# Patient Record
Sex: MALE | Race: WHITE | URBAN - NONMETROPOLITAN AREA
[De-identification: names, ages, dates, MRNs, and addresses within clinical notes are randomized per-mention and may not be internally consistent; named-entity substitution may affect disease eponyms.]

---

## 2020-07-28 ENCOUNTER — VIRTUAL VISIT (OUTPATIENT)
Dept: FAMILY MEDICINE CLINIC | Age: 70
End: 2020-07-28
Payer: MEDICARE

## 2020-07-28 DIAGNOSIS — I10 ESSENTIAL HYPERTENSION: ICD-10-CM

## 2020-07-28 DIAGNOSIS — I25.10 CORONARY ARTERY DISEASE INVOLVING NATIVE CORONARY ARTERY OF NATIVE HEART WITHOUT ANGINA PECTORIS: ICD-10-CM

## 2020-07-28 DIAGNOSIS — E78.2 MIXED HYPERLIPIDEMIA: Primary | ICD-10-CM

## 2020-07-28 DIAGNOSIS — D69.6 THROMBOCYTOPENIA, UNSPECIFIED (HCC): ICD-10-CM

## 2020-07-28 PROBLEM — I25.2 HISTORY OF MYOCARDIAL INFARCTION: Status: ACTIVE | Noted: 2020-07-28

## 2020-07-28 PROBLEM — M19.90 ARTHRITIS: Status: ACTIVE | Noted: 2020-07-28

## 2020-07-28 PROCEDURE — 99442 PR PHYS/QHP TELEPHONE EVALUATION 11-20 MIN: CPT | Performed by: NURSE PRACTITIONER

## 2020-07-28 RX ORDER — ATORVASTATIN CALCIUM 20 MG/1
TABLET, FILM COATED ORAL
COMMUNITY
Start: 2020-05-26 | End: 2020-07-28 | Stop reason: SDUPTHER

## 2020-07-28 RX ORDER — ASPIRIN 81 MG/1
81 TABLET ORAL DAILY
COMMUNITY

## 2020-07-28 RX ORDER — LISINOPRIL AND HYDROCHLOROTHIAZIDE 20; 25 MG/1; MG/1
TABLET ORAL
COMMUNITY
Start: 2020-05-24 | End: 2020-07-28 | Stop reason: SDUPTHER

## 2020-07-28 RX ORDER — ATORVASTATIN CALCIUM 20 MG/1
20 TABLET, FILM COATED ORAL DAILY
Qty: 90 TAB | Refills: 1 | Status: SHIPPED | OUTPATIENT
Start: 2020-07-28 | End: 2021-02-01

## 2020-07-28 RX ORDER — LISINOPRIL AND HYDROCHLOROTHIAZIDE 20; 25 MG/1; MG/1
1 TABLET ORAL DAILY
Qty: 90 TAB | Refills: 1 | Status: SHIPPED | OUTPATIENT
Start: 2020-07-28 | End: 2021-02-01

## 2020-07-28 NOTE — PROGRESS NOTES
I am seeing this patient today virtually using HIPAA-compliant video-conferencing technology. The patient has previously provided full consent to use this technology and understands the risks and benefits of proceeding. I am seeing the patient today from my office in [place], [place]. The patient is in his/her home located within [place]. Patient already made aware that this is a virtual visit with provider and that for the purposes of billing, we will submit a claim for reimbursement with your insurance company. He/She was informed that he/she will be responsible for any copays, coinsurance amounts or other amounts not covered by his/her insurance company. Patient consented and accepted terms of virtual visit. RAINA Alejandra is a 79 y.o. male and presents today for Follow Up Chronic Condition and Hypertension  He also has a history of a arthritis, CAD, hyperlipidemia, MI and thrombocytopenia. Historically, he has had low platelet count on several previous CBCs. He has seen hematology in the past and does not want to go back as he is not concerned. He does not see cardiology on a regular basis. He does have 1 stent. Takes daily aspirin and is on a statin. Historically, he is refused to go back to cardiology and says he will go back when there is a problem. Allergies    No Known Allergies     Medications    Current Outpatient Medications   Medication Sig Dispense    atorvastatin (LIPITOR) 20 mg tablet      lisinopril-hydroCHLOROthiazide (PRINZIDE, ZESTORETIC) 20-25 mg per tablet      aspirin delayed-release 81 mg tablet Take 81 mg by mouth daily. No current facility-administered medications for this visit.         Health Maintenance    Health Maintenance Due   Topic Date Due    Hepatitis C Screening  1950    Lipid Screen  03/25/1960    DTaP/Tdap/Td series (1 - Tdap) 03/25/1971    Shingrix Vaccine Age 50> (1 of 2) 03/25/2000    FOBT Q1Y Age 50-75  03/25/2000    GLAUCOMA SCREENING Q2Y  2015    Pneumococcal 65+ years (1 of 1 - PPSV23) 2015    Medicare Yearly Exam  2020      Declines all at this time      Problem List    Patient Active Problem List    Diagnosis Date Noted    Arthritis 2020    Coronary artery disease involving native coronary artery of native heart without angina pectoris 2020    Essential hypertension 2020    Mixed hyperlipidemia 2020    History of myocardial infarction 2020    Thrombocytopenia, unspecified ([address] 2020        Family Hx    Family History   Problem Relation Age of Onset    Dementia Mother         Social Hx    Social History     Socioeconomic History    Marital status:      Spouse name: Not on file    Number of children: Not on file    Years of education: Not on file    Highest education level: Not on file   Tobacco Use    Smoking status: Former Smoker     Years: 50.00     Last attempt to quit: 2015     Years since quittin.5    Smokeless tobacco: Former User     Quit date: 2015   Substance and Sexual Activity    Alcohol use: Not Currently    Drug use: Never    Sexual activity: Yes        Surgical Hx    Past Surgical History:   Procedure Laterality Date    CARDIAC SURG PROCEDURE UNLIST          Vitals    There were no vitals taken for this visit. Patient-Reported Vitals 2020   Patient-Reported Systolic  356   Patient-Reported Diastolic 73        ROS    Review of Systems   Constitutional: Negative for malaise/fatigue. HENT: Negative for sinus pain and sore throat. Respiratory: Negative for cough and shortness of breath. Cardiovascular: Negative for chest pain and palpitations. Gastrointestinal: Negative for constipation, diarrhea, heartburn, nausea and vomiting. Musculoskeletal: Negative for falls, joint pain and myalgias. Neurological: Negative for dizziness, weakness and headaches.    Psychiatric/Behavioral: Negative for depression and suicidal ideas. The patient does not have insomnia. Physical Exam    Patient is a 79y.o. year old male     Physical exam was deferred due to [place]- 19 precautions as visit was completed via telemedicine. Assessment/Plan      ICD-10-CM ICD-9-CM    1. Mixed hyperlipidemia  E78.2 272.2    2. Essential hypertension  I10 401.9    3. Coronary artery disease involving native coronary artery of native heart without angina pectoris  I25.10 414.01    4. Thrombocytopenia, unspecified (HCC)  D69.6 287.5      Cont to monitor bp and report consistent readings >130/80 or <100/60. Cont current medication regimen  Patient declines blood work today and states that he will do with next follow up. Patient declines any quality measure screenings at this time. Health Maintenance Items reviewed with patient as noted. 15 minutes spent with patient during virtual appt discussing diagnoses, treatment options, and answering any patient questions.     Ya Hernandez NP

## 2021-02-01 DIAGNOSIS — E78.2 MIXED HYPERLIPIDEMIA: ICD-10-CM

## 2021-02-01 DIAGNOSIS — I10 ESSENTIAL HYPERTENSION: ICD-10-CM

## 2021-02-01 RX ORDER — LISINOPRIL AND HYDROCHLOROTHIAZIDE 20; 25 MG/1; MG/1
TABLET ORAL
Qty: 90 TAB | Refills: 1 | Status: SHIPPED | OUTPATIENT
Start: 2021-02-01 | End: 2021-06-08 | Stop reason: ALTCHOICE

## 2021-02-01 RX ORDER — ATORVASTATIN CALCIUM 20 MG/1
TABLET, FILM COATED ORAL
Qty: 90 TAB | Refills: 1 | Status: SHIPPED | OUTPATIENT
Start: 2021-02-01 | End: 2021-06-08 | Stop reason: SDUPTHER

## 2021-05-03 ENCOUNTER — VIRTUAL VISIT (OUTPATIENT)
Dept: FAMILY MEDICINE CLINIC | Age: 71
End: 2021-05-03
Payer: MEDICARE

## 2021-05-03 DIAGNOSIS — Z13.31 SCREENING FOR DEPRESSION: ICD-10-CM

## 2021-05-03 DIAGNOSIS — Z13.39 SCREENING FOR ALCOHOLISM: ICD-10-CM

## 2021-05-03 DIAGNOSIS — M10.9 ACUTE GOUT INVOLVING TOE OF LEFT FOOT, UNSPECIFIED CAUSE: Primary | ICD-10-CM

## 2021-05-03 DIAGNOSIS — I10 ESSENTIAL HYPERTENSION: ICD-10-CM

## 2021-05-03 DIAGNOSIS — Z00.00 MEDICARE ANNUAL WELLNESS VISIT, SUBSEQUENT: ICD-10-CM

## 2021-05-03 PROCEDURE — G8536 NO DOC ELDER MAL SCRN: HCPCS | Performed by: NURSE PRACTITIONER

## 2021-05-03 PROCEDURE — G0439 PPPS, SUBSEQ VISIT: HCPCS | Performed by: NURSE PRACTITIONER

## 2021-05-03 PROCEDURE — 1101F PT FALLS ASSESS-DOCD LE1/YR: CPT | Performed by: NURSE PRACTITIONER

## 2021-05-03 PROCEDURE — G8756 NO BP MEASURE DOC: HCPCS | Performed by: NURSE PRACTITIONER

## 2021-05-03 PROCEDURE — 99442 PR PHYS/QHP TELEPHONE EVALUATION 11-20 MIN: CPT | Performed by: NURSE PRACTITIONER

## 2021-05-03 PROCEDURE — G8432 DEP SCR NOT DOC, RNG: HCPCS | Performed by: NURSE PRACTITIONER

## 2021-05-03 PROCEDURE — G8427 DOCREV CUR MEDS BY ELIG CLIN: HCPCS | Performed by: NURSE PRACTITIONER

## 2021-05-03 PROCEDURE — 3017F COLORECTAL CA SCREEN DOC REV: CPT | Performed by: NURSE PRACTITIONER

## 2021-05-03 PROCEDURE — G8421 BMI NOT CALCULATED: HCPCS | Performed by: NURSE PRACTITIONER

## 2021-05-03 RX ORDER — METHYLPREDNISOLONE 4 MG/1
TABLET ORAL
Qty: 1 DOSE PACK | Refills: 0 | Status: SHIPPED | OUTPATIENT
Start: 2021-05-03 | End: 2021-06-08

## 2021-05-03 RX ORDER — COLCHICINE 0.6 MG/1
TABLET ORAL
Qty: 30 TAB | Refills: 0 | Status: SHIPPED | OUTPATIENT
Start: 2021-05-03 | End: 2021-06-11

## 2021-05-03 NOTE — PATIENT INSTRUCTIONS
Medicare Wellness Visit, Male    The best way to live healthy is to have a lifestyle where you eat a well-balanced diet, exercise regularly, limit alcohol use, and quit all forms of tobacco/nicotine, if applicable. Regular preventive services are another way to keep healthy. Preventive services (vaccines, screening tests, monitoring & exams) can help personalize your care plan, which helps you manage your own care. Screening tests can find health problems at the earliest stages, when they are easiest to treat. [facility] follows the current, evidence-based guidelines published by the [facility] ([facility]) when recommending preventive services for our patients. Because we follow these guidelines, sometimes recommendations change over time as research supports it. (For example, a prostate screening blood test is no longer routinely recommended for men with no symptoms). Of course, you and your doctor may decide to screen more often for some diseases, based on your risk and co-morbidities (chronic disease you are already diagnosed with). Preventive services for you include:  - Medicare offers their members a free annual wellness visit, which is time for you and your primary care provider to discuss and plan for your preventive service needs. Take advantage of this benefit every year!  -All adults over age 72 should receive the recommended pneumonia vaccines. Current USPSTF guidelines recommend a series of two vaccines for the best pneumonia protection.   -All adults should have a flu vaccine yearly and tetanus vaccine every 10 years.  -All adults age 48 and older should receive the shingles vaccines (series of two vaccines).        -All adults age 38-68 who are overweight should have a diabetes screening test once every three years.   -Other screening tests & preventive services for persons with diabetes include: an eye exam to screen for diabetic retinopathy, a kidney function test, a foot exam, and stricter control over your cholesterol.   -Cardiovascular screening for adults with routine risk involves an electrocardiogram (ECG) at intervals determined by the provider.   -Colorectal cancer screening should be done for adults age 54-65 with no increased risk factors for colorectal cancer. There are a number of acceptable methods of screening for this type of cancer. Each test has its own benefits and drawbacks. Discuss with your provider what is most appropriate for you during your annual wellness visit. The different tests include: colonoscopy (considered the best screening method), a fecal occult blood test, a fecal DNA test, and sigmoidoscopy.  -All adults born between [facility] should be screened once for Hepatitis C.  -An Abdominal Aortic Aneurysm (AAA) Screening is recommended for men age 73-68 who has ever smoked in their lifetime.      Here is a list of your current Health Maintenance items (your personalized list of preventive services) with a due date:  Health Maintenance Due   Topic Date Due    Hepatitis C Test  Never done    Cholesterol Test   Never done    COVID-19 Vaccine (1) Never done    DTaP/Tdap/Td  (1 - Tdap) Never done    Shingles Vaccine (1 of 2) Never done    Colorectal Screening  Never done    Pneumococcal Vaccine (1 of 1 - PPSV23) Never done

## 2021-05-03 NOTE — PROGRESS NOTES
Ofelia Shafer presents today for   Chief Complaint   Patient presents with    Gout     Since Fri    Annual Wellness Visit     medicare       Depression Screening:  3 most recent PHQ Screens 5/3/2021   PHQ Not Done -   Little interest or pleasure in doing things Not at all   Feeling down, depressed, irritable, or hopeless Not at all   Total Score PHQ 2 0       Learning Assessment:  No flowsheet data found. Fall Risk  Fall Risk Assessment, last 12 mths 5/3/2021   Able to walk? Yes   Fall in past 12 months? 0       ADL  ADL Assessment 5/3/2021   Feeding yourself No Help Needed   Getting from bed to chair No Help Needed   Getting dressed No Help Needed   Bathing or showering No Help Needed   Walk across the room (includes cane/walker) No Help Needed   Using the telphone No Help Needed   Taking your medications No Help Needed   Preparing meals No Help Needed   Managing money (expenses/bills) No Help Needed   Moderately strenuous housework (laundry) No Help Needed   Shopping for personal items (toiletries/medicines) No Help Needed   Shopping for groceries No Help Needed   Driving No Help Needed   Climbing a flight of stairs No Help Needed   Getting to places beyond walking distances No Help Needed       Health Maintenance reviewed and discussed and ordered per Provider. Health Maintenance Due   Topic Date Due    Hepatitis C Screening  Never done    Lipid Screen  Never done    COVID-19 Vaccine (1) Never done    DTaP/Tdap/Td series (1 - Tdap) Never done    Shingrix Vaccine Age 50> (1 of 2) Never done    Colorectal Cancer Screening Combo  Never done    Pneumococcal 65+ years (1 of 1 - PPSV23) Never done    Medicare Yearly Exam  Never done   . Coordination of Care:  1. Have you been to the ER, urgent care clinic since your last visit? Hospitalized since your last visit? no    2. Have you seen or consulted any other health care providers outside of the [address] [place] [address] [place] since your last visit? Include any pap smears or colon screening.  No    Height-5'11    BP taken yesterday    Patient has been eating Butterfinger a lot    Foot, ankle and toes on left side of body

## 2021-05-03 NOTE — PROGRESS NOTES
I am seeing this patient today virtually using HIPAA-compliant video-conferencing technology due to the COVID-19 Pandemic. The patient has previously provided full consent to use this technology and understands the risks and benefits of proceeding. I am seeing the patient today from my office in [place], [place]. The patient is in his/her home located within [place]. Patient already made aware that this is a virtual visit/telehealth with provider and that for the purposes of billing, we will submit a claim for reimbursement with your insurance company. He/She was informed that he/she will be responsible for any copays, coinsurance amounts or other amounts not covered by his/her insurance company. Patient consented and accepted terms of virtual visit. THIS VISIT WAS CONDUCTED OVER THE TELEPHONE    HPI  Cindy Davis is a 70 y.o. male and presents today for Gout (Since Fri) and Annual Wellness Visit (medicare)  In addition to updating his Medicare wellness he has complaints of gout flareup in his left great toe. He wonders if it has something to do with his blood pressure medication. He also admits to eating a lot of chocolate lately. He is overdue for his chronic follow-up with me and will get that scheduled to come into the office for. He is also overdue for blood work which we will update at that time.     Allergies  No Known Allergies     Medications  Current Outpatient Medications   Medication Sig Dispense    methylPREDNISolone (MEDROL DOSEPACK) 4 mg tablet Take as directed on packaging 1 Dose Pack    colchicine 0.6 mg tablet Take 1 tab by mouth three times daily on first day of gout attack, then day 2 and beyond, 1 tab by mouth daily until attack resolves 30 Tab    atorvastatin (LIPITOR) 20 mg tablet TAKE 1 TABLET BY MOUTH DAILY 90 Tab    lisinopril-hydroCHLOROthiazide (PRINZIDE, ZESTORETIC) 20-25 mg per tablet TAKE 1 TABLET BY MOUTH DAILY 90 Tab    aspirin delayed-release 81 mg tablet Take 81 mg by mouth daily. No current facility-administered medications for this visit. Screening  On the basis of positive PHQ-9 screening ( ), C-SSRS completed. Patient will follow-up as needed/as directed with PCP. Health Maintenance  Health Maintenance Due   Topic Date Due    Lipid Screen  Never done    Colorectal Cancer Screening Combo  Never done        Problem List  Patient Active Problem List    Diagnosis Date Noted    Arthritis 2020    Coronary artery disease involving native coronary artery of native heart without angina pectoris 2020    Essential hypertension 2020    Mixed hyperlipidemia 2020    History of myocardial infarction 2020    Thrombocytopenia, unspecified ([facility] [address] 2020        Family Hx  Family History   Problem Relation Age of Onset    Dementia Mother         Social Hx  Social History     Socioeconomic History    Marital status:      Spouse name: Not on file    Number of children: Not on file    Years of education: Not on file    Highest education level: Not on file   Tobacco Use    Smoking status: Former Smoker     Years: 50.00     Quit date: 2015     Years since quittin.3    Smokeless tobacco: Former User     Quit date: 2015   Substance and Sexual Activity    Alcohol use: Not Currently    Drug use: Never    Sexual activity: Yes        Surgical Hx  Past Surgical History:   Procedure Laterality Date    [place] CARDIAC SURG PROCEDURE UNLIST          Vitals  There were no vitals taken for this visit. Patient-Reported Vitals 5/3/2021   Patient-Reported Weight 210   Patient-Reported Pulse 72   Patient-Reported Systolic  508   Patient-Reported Diastolic 80        ROS  Review of Systems   Constitutional: Negative for chills, fever and malaise/fatigue. HENT: Negative for congestion, ear pain, sinus pain and sore throat. Eyes: Negative for blurred vision and redness.    Respiratory: Negative for cough, sputum production, shortness of breath and wheezing. Cardiovascular: Negative for chest pain, palpitations and leg swelling. Gastrointestinal: Negative for abdominal pain, constipation, diarrhea, heartburn, nausea and vomiting. Genitourinary: Negative for dysuria and hematuria. Musculoskeletal: Negative for falls, joint pain and myalgias. Skin: Negative for rash. Neurological: Negative for dizziness, seizures, weakness and headaches. Endo/Heme/Allergies: Does not bruise/bleed easily. Psychiatric/Behavioral: Negative for depression and suicidal ideas. The patient does not have insomnia. Physical Exam  Patient is a 70y.o. year old male     Physical exam was deferred due to [place]- 19 precautions as visit was completed via telemedicine. Assessment/Plan  1. Medicare annual wellness visit, subsequent  See Medicare wellness note  - [place] ANNU[place] ALCOH[place] SCREEN 15 MIN    2. Screening for alcoholism  See Medicare wellness note  - [place] ANNUAL ALCOH[place] SCREEN 15 MIN    3. Screening for depression  See Medicare wellness note  - Mckayla Griffin    4. Acute gout involving toe of left foot, unspecified cause  Left great toe; will start steroids and NSAIDs per Rx; medication side effects discussed  - methylPREDNISolone (MEDROL DOSEPACK) 4 mg tablet; Take as directed on packaging  Dispense: 1 Dose Pack; Refill: 0  - colchicine 0.6 mg tablet; Take 1 tab by mouth three times daily on first day of gout attack, then day 2 and beyond, 1 tab by mouth daily until attack resolves  Dispense: 30 Tab; Refill: 0    5. Essential hypertension  Home BP that was reported is okay at this time; we discussed because of his recent gout flareups that we may need to discontinue HCTZ from his blood pressure medication. We would then do only lisinopril. Patient will consider this in the future with his next refill. Health Maintenance Items reviewed with patient; updates completed if applicable.     >21 minutes spent with patient/reviewing records during virtual appt discussing diagnoses, treatment options, and answering any patient questions. Disclaimer:  I have discussed the diagnosis with the patient and the intended plan as seen above. The patient understands our medical plan. The risks, benefits and significant side effects of all medications have been reviewed. Anticipated time course and progression of condition reviewed. All questions have been addressed. He received an after visit summary, with information reviewed, and questions answered. Where appropriate, he/she is instructed to call the clinic if he/she has not been notified either by phone or through [id number] with the results of his/her tests or with an appointment plan for any referrals within 1 week(s). The patient  is to call if his condition worsens or fails to improve or if significant side effects are experienced.      JS Cabrera-BC

## 2021-06-08 ENCOUNTER — OFFICE VISIT (OUTPATIENT)
Dept: FAMILY MEDICINE CLINIC | Age: 71
End: 2021-06-08
Payer: MEDICARE

## 2021-06-08 VITALS
HEART RATE: 66 BPM | BODY MASS INDEX: 30.24 KG/M2 | OXYGEN SATURATION: 96 % | SYSTOLIC BLOOD PRESSURE: 118 MMHG | DIASTOLIC BLOOD PRESSURE: 60 MMHG | HEIGHT: 71 IN | WEIGHT: 216 LBS | TEMPERATURE: 98 F

## 2021-06-08 DIAGNOSIS — E55.9 VITAMIN D DEFICIENCY: ICD-10-CM

## 2021-06-08 DIAGNOSIS — E53.8 VITAMIN B12 DEFICIENCY: ICD-10-CM

## 2021-06-08 DIAGNOSIS — R25.2 LEG CRAMP: ICD-10-CM

## 2021-06-08 DIAGNOSIS — I25.10 CORONARY ARTERY DISEASE INVOLVING NATIVE CORONARY ARTERY OF NATIVE HEART WITHOUT ANGINA PECTORIS: ICD-10-CM

## 2021-06-08 DIAGNOSIS — M10.9 GOUT, UNSPECIFIED CAUSE, UNSPECIFIED CHRONICITY, UNSPECIFIED SITE: ICD-10-CM

## 2021-06-08 DIAGNOSIS — D69.6 THROMBOCYTOPENIA, UNSPECIFIED (HCC): ICD-10-CM

## 2021-06-08 DIAGNOSIS — E78.2 MIXED HYPERLIPIDEMIA: ICD-10-CM

## 2021-06-08 DIAGNOSIS — I10 ESSENTIAL HYPERTENSION: Primary | ICD-10-CM

## 2021-06-08 DIAGNOSIS — R73.01 IMPAIRED FASTING GLUCOSE: ICD-10-CM

## 2021-06-08 DIAGNOSIS — I10 ESSENTIAL HYPERTENSION: ICD-10-CM

## 2021-06-08 DIAGNOSIS — Z12.5 SCREENING FOR MALIGNANT NEOPLASM OF PROSTATE: ICD-10-CM

## 2021-06-08 DIAGNOSIS — Z12.11 SCREENING FOR MALIGNANT NEOPLASM OF COLON: ICD-10-CM

## 2021-06-08 PROCEDURE — G8417 CALC BMI ABV UP PARAM F/U: HCPCS | Performed by: NURSE PRACTITIONER

## 2021-06-08 PROCEDURE — G8427 DOCREV CUR MEDS BY ELIG CLIN: HCPCS | Performed by: NURSE PRACTITIONER

## 2021-06-08 PROCEDURE — G8754 DIAS BP LESS 90: HCPCS | Performed by: NURSE PRACTITIONER

## 2021-06-08 PROCEDURE — G8752 SYS BP LESS 140: HCPCS | Performed by: NURSE PRACTITIONER

## 2021-06-08 PROCEDURE — G8536 NO DOC ELDER MAL SCRN: HCPCS | Performed by: NURSE PRACTITIONER

## 2021-06-08 PROCEDURE — 3017F COLORECTAL CA SCREEN DOC REV: CPT | Performed by: NURSE PRACTITIONER

## 2021-06-08 PROCEDURE — 99214 OFFICE O/P EST MOD 30 MIN: CPT | Performed by: NURSE PRACTITIONER

## 2021-06-08 PROCEDURE — 1101F PT FALLS ASSESS-DOCD LE1/YR: CPT | Performed by: NURSE PRACTITIONER

## 2021-06-08 PROCEDURE — G8432 DEP SCR NOT DOC, RNG: HCPCS | Performed by: NURSE PRACTITIONER

## 2021-06-08 RX ORDER — ATORVASTATIN CALCIUM 20 MG/1
TABLET, FILM COATED ORAL
Qty: 90 TABLET | Refills: 1 | Status: SHIPPED | OUTPATIENT
Start: 2021-06-08 | End: 2021-07-23 | Stop reason: SDUPTHER

## 2021-06-08 RX ORDER — LISINOPRIL 20 MG/1
20 TABLET ORAL DAILY
Qty: 90 TABLET | Refills: 1 | Status: SHIPPED | OUTPATIENT
Start: 2021-06-08 | End: 2021-07-23 | Stop reason: SDUPTHER

## 2021-06-08 NOTE — PROGRESS NOTES
Gonzalez Oneal presents today for   Chief Complaint   Patient presents with    Hypertension     1 month f/u       Is someone accompanying this pt? No       Is the patient using any DME equipment during OV? no    Depression Screening:  3 most recent PHQ Screens 6/8/2021   PHQ Not Done -   Little interest or pleasure in doing things Not at all   Feeling down, depressed, irritable, or hopeless Not at all   Total Score PHQ 2 0       Learning Assessment:  Learning Assessment 5/3/2021   PRIMARY LEARNER Patient   HIGHEST LEVEL OF EDUCATION - PRIMARY LEARNER  GRADUATED HIGH SCHOOL OR GED   BARRIERS PRIMARY LEARNER NONE   CO-LEARNER CAREGIVER No   PRIMARY LANGUAGE ENGLISH   LEARNER PREFERENCE PRIMARY READING   ANSWERED BY patient   RELATIONSHIP SELF       Fall Risk  Fall Risk Assessment, last 12 mths 6/8/2021   Able to walk? Yes   Fall in past 12 months? 0       ADL  ADL Assessment 6/8/2021   Feeding yourself No Help Needed   Getting from bed to chair No Help Needed   Getting dressed No Help Needed   Bathing or showering No Help Needed   Walk across the room (includes cane/walker) No Help Needed   Using the telphone No Help Needed   Taking your medications No Help Needed   Preparing meals No Help Needed   Managing money (expenses/bills) No Help Needed   Moderately strenuous housework (laundry) No Help Needed   Shopping for personal items (toiletries/medicines) No Help Needed   Shopping for groceries No Help Needed   Driving No Help Needed   Climbing a flight of stairs No Help Needed   Getting to places beyond walking distances No Help Needed       Health Maintenance reviewed and discussed and ordered per Provider. Health Maintenance Due   Topic Date Due    Lipid Screen  Never done    Colorectal Cancer Screening Combo  Never done   . Coordination of Care:  1. Have you been to the ER, urgent care clinic since your last visit? Hospitalized since your last visit? no    2.  Have you seen or consulted any other health care providers outside of the [address] [place] [address] since your last visit? Include any pap smears or colon screening. no    BP medication? Gout medication?

## 2021-06-08 NOTE — PROGRESS NOTES
HPI    Harriet Bergman is a 70 y.o. male and presents today for Hypertension (1 month f/u)  He has a history of hypertension, hyperlipidemia and gout. He also has a history of CAD. He does not see cardiology. Previously when we have discussed this, he says that he will follow up with cardiology when he has problems. He denies chest pain, shortness of breath, dizziness, weakness, abdominal pain and denies swelling in his lower legs. He has had several gout flareups and so we are following up today on his blood pressure. Previously we had discussed converting his lisinopril/HCTZ to just lisinopril. Is also due for blood work. Never had a colonoscopy but would like to do the Cologuard. He also complains of occasional leg cramps. Allergies    No Known Allergies     Medications    Current Outpatient Medications   Medication Sig Dispense    atorvastatin (LIPITOR) 20 mg tablet TAKE 1 TABLET BY MOUTH DAILY 90 Tablet    lisinopriL (PRINIVIL, ZESTRIL) 20 mg tablet Take 1 Tablet by mouth daily. 90 Tablet    colchicine 0.6 mg tablet Take 1 tab by mouth three times daily on first day of gout attack, then day 2 and beyond, 1 tab by mouth daily until attack resolves 30 Tab    aspirin delayed-release 81 mg tablet Take 81 mg by mouth daily. No current facility-administered medications for this visit. Screening  On the basis of positive PHQ-9 screening ( ), C-SSRS completed. Patient will follow-up as needed/as directed with PCP.     Health Maintenance    Health Maintenance Due   Topic Date Due    Lipid Screen  Never done    Colorectal Cancer Screening Combo  Never done        Problem List    Patient Active Problem List    Diagnosis Date Noted    Gout 06/08/2021    Leg cramp 06/08/2021    Arthritis 07/28/2020    Coronary artery disease involving native coronary artery of native heart without angina pectoris 07/28/2020    Essential hypertension 07/28/2020    Mixed hyperlipidemia 07/28/2020    History of myocardial infarction 2020    Thrombocytopenia, unspecified ([facility] [address] 2020        Family Hx    Family History   Problem Relation Age of Onset    Dementia Mother         Social Hx    Social History     Socioeconomic History    Marital status:      Spouse name: Not on file    Number of children: Not on file    Years of education: Not on file    Highest education level: Not on file   Tobacco Use    Smoking status: Former Smoker     Years: 50.00     Quit date: 2015     Years since quittin.4    Smokeless tobacco: Former User     Quit date: 2015   Vaping Use    Vaping Use: Never used   Substance and Sexual Activity    Alcohol use: Not Currently    Drug use: Never    Sexual activity: Yes     Social Determinants of Health     Financial Resource Strain:     Difficulty of Paying Living Expenses:    Food Insecurity:     Worried About Running Out of Food in the Last Year:     [address] in the Last Year:    Transportation Needs:     Lack of Transportation (Medical):  Lack of Transportation (Non-Medical):    Physical Activity:     Days of Exercise per Week:     Minutes of Exercise per Session:    Stress:     Feeling of Stress :    Social Connections:     Frequency of Communication with Friends and Family:     Frequency of Social Gatherings with Friends and Family:     Attends Yazidism Services:     Active Member of Clubs or Organizations:     Attends Club or Organization Meetings:     Marital Status:         Surgical Hx    Past Surgical History:   Procedure Laterality Date    [place] CARDIAC SURG PROCEDURE UNLIST            Vitals    Visit Vitals  /60   Pulse 66   Temp 98 °F (36.7 °C)   Ht 5' 11\" (1.803 m)   Wt 216 lb (98 kg)   SpO2 96%   BMI 30.13 kg/m²        ROS    Review of Systems   Constitutional: Negative for chills, fever and malaise/fatigue. HENT: Negative for congestion, ear pain, sinus pain and sore throat.     Eyes: Negative for blurred vision and redness. Respiratory: Negative for cough, sputum production, shortness of breath and wheezing. Cardiovascular: Negative for chest pain, palpitations and leg swelling. Gastrointestinal: Negative for abdominal pain, constipation, diarrhea, heartburn, nausea and vomiting. Genitourinary: Negative for dysuria and hematuria. Musculoskeletal: Positive for myalgias (leg cramps(bilateral)). Negative for falls and joint pain. Skin: Negative for rash. Neurological: Negative for dizziness, seizures, weakness and headaches. Endo/Heme/Allergies: Does not bruise/bleed easily. Psychiatric/Behavioral: Negative for depression and suicidal ideas. The patient does not have insomnia. Physical Exam    Physical Exam  Vitals and nursing note reviewed. Constitutional:       General: He is awake. He is not in acute distress. Appearance: Normal appearance. He is not ill-appearing or toxic-appearing. HENT:      Head: Normocephalic and atraumatic. Right Ear: External ear normal.      Left Ear: External ear normal.      Nose: Nose normal.      Right Sinus: No maxillary sinus tenderness or frontal sinus tenderness. Left Sinus: No maxillary sinus tenderness or frontal sinus tenderness. Mouth/Throat:      Mouth: Mucous membranes are moist.      Pharynx: Oropharynx is clear. No oropharyngeal exudate or posterior oropharyngeal erythema. Eyes:      General:         Right eye: No discharge. Left eye: No discharge. Extraocular Movements: Extraocular movements intact. Conjunctiva/sclera: Conjunctivae normal.      Pupils: Pupils are equal, round, and reactive to light. Cardiovascular:      Rate and Rhythm: Normal rate and regular rhythm. Pulses: Normal pulses. Heart sounds: Normal heart sounds. No murmur heard. Pulmonary:      Effort: Pulmonary effort is normal. No respiratory distress. Breath sounds: Normal breath sounds. No stridor.  No wheezing, rhonchi or rales. Chest:      Chest wall: No tenderness. Abdominal:      General: Abdomen is flat. Bowel sounds are normal. There is no distension. Palpations: Abdomen is soft. There is no mass. Tenderness: There is no abdominal tenderness. There is no right CVA tenderness, left CVA tenderness, guarding or rebound. Hernia: No hernia is present. Musculoskeletal:         General: No swelling. Normal range of motion. Cervical back: Normal range of motion and neck supple. Right lower leg: No edema. Left lower leg: No edema. Skin:     General: Skin is warm and dry. Capillary Refill: Capillary refill takes less than 2 seconds. Coloration: Skin is not jaundiced or pale. Findings: No bruising, erythema, lesion or rash. Neurological:      General: No focal deficit present. Mental Status: He is alert and oriented to person, place, and time. Cranial Nerves: No cranial nerve deficit. Motor: No weakness. Gait: Gait normal.      Deep Tendon Reflexes: Reflexes normal.   Psychiatric:         Mood and Affect: Mood normal.         Behavior: Behavior normal.         Thought Content: Thought content normal.         Judgment: Judgment normal.          Assessment/Plan    1. Essential hypertension  His BP is excellent today in office. I am refilling his medication and only giving him lisinopril. I am stopping the HCTZ due to his recentfrequent gout flareups. Cont to monitor bp and report consistent readings >130/80 or <100/60. Will update blood work; any changes to be made are contingent on the results. - LIPID PANEL; Future  - METABOLIC PANEL, COMPREHENSIVE; Future  - TSH 3RD GENERATION; Future  - lisinopriL (PRINIVIL, ZESTRIL) 20 mg tablet; Take 1 Tablet by mouth daily. Dispense: 90 Tablet; Refill: 1    2. Thrombocytopenia, unspecified ([facility] [address]  Chronic history of this; I believe he has seen hematology in the past with a negative work-up; we will continue to monitor. Will update blood work; any changes to be made are contingent on the results. - CBC W/O DIFF; Future    3. Mixed hyperlipidemia  He is currently on statin therapy with generic Lipitor 20 mg daily; will refill; Will update blood work; any changes to be made are contingent on the results. - LIPID PANEL; Future  - METABOLIC PANEL, COMPREHENSIVE; Future  - atorvastatin (LIPITOR) 20 mg tablet; TAKE 1 TABLET BY MOUTH DAILY  Dispense: 90 Tablet; Refill: 1    4. Coronary artery disease involving native coronary artery of native heart without angina pectoris  He has not seen cardiology in quite some time; he is not interested in doing so as he is not having any problems at this time    5. Impaired fasting glucose  - HEMOGLOBIN A1C WITH EAG; Future    6. Screening for malignant neoplasm of prostate  - PSA SCREENING (SCREENING)    7. Gout, unspecified cause, unspecified chronicity, unspecified site  He has had a few flare ups; we will check labs; I am stopping his hctz to see if this makes any difference  - URIC ACID; Future    8. Screening for malignant neoplasm of colon  Agrees to cologuard  - COLOGUARD TEST (FECAL DNA COLORECTAL CANCER SCREENING)    9. Vitamin B12 deficiency  - VITAMIN B12 & FOLATE    10. Vitamin D deficiency  - VITAMIN D, 25 HYDROXY; Future    11. Leg cramp  Will update blood work; any changes to be made are contingent on the results. - MAGNESIUM; Future     Health Maintenance Items reviewed with patient as noted.     Orders Placed This Encounter    HEMOGLOBIN A1C WITH EAG    LIPID PANEL    METABOLIC PANEL, COMPREHENSIVE    TSH 3RD GENERATION    PSA SCREENING (SCREENING)    COLOGUARD TEST (FECAL DNA COLORECTAL CANCER SCREENING)    URIC ACID    CBC W/O DIFF    VITAMIN B12 & FOLATE    VITAMIN D, 25 HYDROXY    MAGNESIUM    atorvastatin (LIPITOR) 20 mg tablet    lisinopriL (PRINIVIL, ZESTRIL) 20 mg tablet        Tyrell Ansari, MSN, FNP-BC

## 2021-06-11 DIAGNOSIS — M10.9 ACUTE GOUT INVOLVING TOE OF LEFT FOOT, UNSPECIFIED CAUSE: ICD-10-CM

## 2021-06-11 RX ORDER — LISINOPRIL AND HYDROCHLOROTHIAZIDE 20; 25 MG/1; MG/1
TABLET ORAL
Qty: 90 TABLET | Refills: 1 | Status: SHIPPED | OUTPATIENT
Start: 2021-06-11 | End: 2021-07-23

## 2021-06-11 RX ORDER — COLCHICINE 0.6 MG/1
TABLET ORAL
Qty: 30 TABLET | Refills: 0 | Status: SHIPPED | OUTPATIENT
Start: 2021-06-11

## 2021-06-23 LAB
CREATININE, EXTERNAL: 1
HBA1C MFR BLD HPLC: 5.5 %
LDL-C, EXTERNAL: 60
PSA, EXTERNAL: 0.39

## 2021-06-28 RX ORDER — ERGOCALCIFEROL 1.25 MG/1
50000 CAPSULE ORAL
Qty: 12 CAPSULE | Refills: 3 | Status: SHIPPED | OUTPATIENT
Start: 2021-06-28 | End: 2021-09-01 | Stop reason: SDUPTHER

## 2021-06-28 RX ORDER — FOLIC ACID 1 MG/1
1 TABLET ORAL DAILY
Qty: 90 TABLET | Refills: 1 | Status: SHIPPED | OUTPATIENT
Start: 2021-06-28

## 2021-06-28 RX ORDER — METHYLPREDNISOLONE 4 MG/1
TABLET ORAL
Qty: 1 DOSE PACK | Refills: 0 | Status: SHIPPED | OUTPATIENT
Start: 2021-06-28

## 2021-06-28 NOTE — PROGRESS NOTES
He continues to have low platelets; we have been following this for some time; I can send him to the hematologist for further eval of this but I think he is declined in the past.  Otherwise we will continue to watch. His vitamin D is a little low so I will send in prescription strength for him to take. His kidney and liver function look great. His magnesium level is normal.  His cholesterol levels, A1c, vitamin B12 are all normal.  His folic acid level is little bit low so I will send in a supplement for this as well. His thyroid is normal.  His uric acid level which is indicative of gout is actually elevated. He can use colchicine if he still has some; I can send in a prescription for steroids as well.

## 2021-07-23 ENCOUNTER — TELEPHONE (OUTPATIENT)
Dept: INTERNAL MEDICINE CLINIC | Age: 71
End: 2021-07-23

## 2021-07-23 DIAGNOSIS — D69.6 THROMBOCYTOPENIA (HCC): Primary | ICD-10-CM

## 2021-07-23 DIAGNOSIS — I10 ESSENTIAL HYPERTENSION: ICD-10-CM

## 2021-07-23 DIAGNOSIS — E78.2 MIXED HYPERLIPIDEMIA: ICD-10-CM

## 2021-07-23 RX ORDER — ATORVASTATIN CALCIUM 20 MG/1
TABLET, FILM COATED ORAL
Qty: 90 TABLET | Refills: 1 | Status: SHIPPED | OUTPATIENT
Start: 2021-07-23 | End: 2022-01-27 | Stop reason: SDUPTHER

## 2021-07-23 RX ORDER — LISINOPRIL 20 MG/1
20 TABLET ORAL DAILY
Qty: 90 TABLET | Refills: 1 | Status: SHIPPED | OUTPATIENT
Start: 2021-07-23 | End: 2022-01-27 | Stop reason: SDUPTHER

## 2021-07-23 NOTE — PROGRESS NOTES
Cologuard screening results are positive; the recommendations are that the patient be referred for eval and need for colonoscopy. We can put in a referral for GI if the patient agrees.

## 2021-07-23 NOTE — TELEPHONE ENCOUNTER
Patient says that he has low platelets and that he has found out that atorvastatin and aspirin can cause this. He wants to know if he should cut back on these two meds to help with the low platelet issue instead of having to take another pill for the platelets. He also says he needs his medications refilled and RX sent per MATTHEW Staton FNP order.   [id number]

## 2021-07-23 NOTE — PROGRESS NOTES
Patient says that he would like to take another test as he does not think this is right because of the rate of false positive results. He said he would like to repeat if this is an option and if they both say positive, then he would be more agreeable to have the colonoscopy. Please advise.

## 2021-08-04 ENCOUNTER — TELEPHONE (OUTPATIENT)
Dept: FAMILY MEDICINE CLINIC | Age: 71
End: 2021-08-04

## 2021-08-04 DIAGNOSIS — R19.5 POSITIVE COLORECTAL CANCER SCREENING USING COLOGUARD TEST: Primary | ICD-10-CM

## 2021-08-04 NOTE — TELEPHONE ENCOUNTER
Pt came by with request for a colonoscopy referral to [address] [place], [place] [address]ge  [phone]. He states he didn't pass the cologard test. He also said we did a referral to Hemolotogy and he doesn't know if he needs to go there before he does the colonoscopy or not so he wants to discuss.

## 2021-08-04 NOTE — TELEPHONE ENCOUNTER
Referral entered for GI, can go to hematology appointment before or after colonoscopy does not matter.

## 2021-09-01 ENCOUNTER — TELEPHONE (OUTPATIENT)
Dept: FAMILY MEDICINE CLINIC | Age: 71
End: 2021-09-01

## 2021-09-01 DIAGNOSIS — E55.9 VITAMIN D DEFICIENCY: Primary | ICD-10-CM

## 2021-09-01 RX ORDER — ERGOCALCIFEROL 1.25 MG/1
50000 CAPSULE ORAL
Qty: 12 CAPSULE | Refills: 1 | Status: SHIPPED | OUTPATIENT
Start: 2021-09-01 | End: 2022-01-27 | Stop reason: SDUPTHER

## 2022-01-27 ENCOUNTER — VIRTUAL VISIT (OUTPATIENT)
Dept: FAMILY MEDICINE CLINIC | Age: 72
End: 2022-01-27
Payer: MEDICARE

## 2022-01-27 DIAGNOSIS — E55.9 VITAMIN D DEFICIENCY: ICD-10-CM

## 2022-01-27 DIAGNOSIS — I10 ESSENTIAL HYPERTENSION: ICD-10-CM

## 2022-01-27 DIAGNOSIS — E78.2 MIXED HYPERLIPIDEMIA: ICD-10-CM

## 2022-01-27 DIAGNOSIS — I25.10 CORONARY ARTERY DISEASE INVOLVING NATIVE CORONARY ARTERY OF NATIVE HEART WITHOUT ANGINA PECTORIS: Primary | ICD-10-CM

## 2022-01-27 PROCEDURE — 99443 PR PHYS/QHP TELEPHONE EVALUATION 21-30 MIN: CPT | Performed by: NURSE PRACTITIONER

## 2022-01-27 RX ORDER — LISINOPRIL 20 MG/1
20 TABLET ORAL DAILY
Qty: 90 TABLET | Refills: 1 | Status: SHIPPED | OUTPATIENT
Start: 2022-01-27

## 2022-01-27 RX ORDER — ASCORBIC ACID 500 MG
1000 TABLET ORAL DAILY
COMMUNITY

## 2022-01-27 RX ORDER — ERGOCALCIFEROL 1.25 MG/1
50000 CAPSULE ORAL
Qty: 12 CAPSULE | Refills: 1 | Status: SHIPPED | OUTPATIENT
Start: 2022-01-27

## 2022-01-27 RX ORDER — ATORVASTATIN CALCIUM 20 MG/1
TABLET, FILM COATED ORAL
Qty: 90 TABLET | Refills: 1 | Status: SHIPPED | OUTPATIENT
Start: 2022-01-27

## 2022-01-27 NOTE — PROGRESS NOTES
Dmitry Meredith presents today for   Chief Complaint   Patient presents with   [facility].     transfer from [address] visit    Depression Screening:  3 most recent [address] 1/27/2022   [facility] Not Done -   Little interest or pleasure in doing things Not at all   Feeling down, depressed, irritable, or hopeless Not at all   Total Score PHQ 2 0       Learning Assessment:  Learning Assessment 5/3/2021   PRIMARY LEARNER Patient   HIGHEST LEVEL OF EDUCATION - PRIMARY LEARNER  GRADUATED HIGH SCHOOL OR GED   BARRIERS PRIMARY LEARNER NONE   CO-LEARNER CAREGIVER No   PRIMARY LANGUAGE ENGLISH   LEARNER PREFERENCE PRIMARY READING   ANSWERED BY patient   RELATIONSHIP SELF       Abuse Screening:  Abuse Screening Questionnaire 6/8/2021   Do you ever feel afraid of your partner? N   Are you in a relationship with someone who physically or mentally threatens you? N   Is it safe for you to go home? Y       Fall Risk  Fall Risk Assessment, last 12 mths 1/27/2022   Able to walk? Yes   Fall in past 12 months? 0   Do you feel unsteady? 0   Are you worried about falling 0       ADL  ADL Assessment 6/8/2021   Feeding yourself No Help Needed   Getting from bed to chair No Help Needed   Getting dressed No Help Needed   Bathing or showering No Help Needed   Walk across the room (includes cane/walker) No Help Needed   Using the telphone No Help Needed   Taking your medications No Help Needed   Preparing meals No Help Needed   Managing money (expenses/bills) No Help Needed   Moderately strenuous housework (laundry) No Help Needed   Shopping for personal items (toiletries/medicines) No Help Needed   Shopping for groceries No Help Needed   Driving No Help Needed   Climbing a flight of stairs No Help Needed   Getting to places beyond walking distances No Help Needed       Health Maintenance reviewed and discussed and ordered per Provider. There are no preventive care reminders to display for this patient. Maritza Finn Coordination of Care:  1. Have you been to the ER, urgent care clinic since your last visit? Hospitalized since your last visit? Due to blood pressure     2. Have you seen or consulted any other health care providers outside of the [address] [place] [address] since your last visit? Include any pap smears or colon screening.  no

## 2022-02-01 ENCOUNTER — TELEPHONE (OUTPATIENT)
Dept: FAMILY MEDICINE CLINIC | Age: 72
End: 2022-02-01

## 2022-02-01 NOTE — TELEPHONE ENCOUNTER
When you get a chance, could you call this patient? He said that he wanted Caren to send in an antibiotic for his sinus infection. I told him he would need a Covid test.  He didn't want to do that because he said he gets sinus infection all of the time and I told him the nurse would give him a call.     [phone]

## 2022-02-01 NOTE — PROGRESS NOTES
Pursuant to the emergency declaration under the [facility], [facility] waiver authority and the [employer] and [employer] Act, this phone visit was conducted, with patient's consent, to reduce the patient's risk of exposure to COVID-19 and provide continuity of care for an established patient. He and/or health care decision maker is aware that that he may receive a bill for this telephone service, depending on his insurance coverage, and has provided verbal consent to proceed. This is a Patient Initiated Episode with an Established Patient who has not had a related appointment within my department in the past 7 days or scheduled within the next 24 hours. HISTORY OF PRESENTING ILLNESS      Henna Chen is a 70 y.o. male evaluated via telephone on 1/27/2022 due to COVID 19 restrictions. Patient unable to participate in Virtual Visit with synchronous audio/visual technology. Patient presents today to establish care. He has history significant for MI not currently followed by cardiology and recent ER visit January 10, 2022 related to essential hypertension. PCP Provider  Trevor Villa NP  Past Medical History:   Diagnosis Date    History of myocardial infarction 7/28/2020    Hypercholesterolemia     Hypertension       Past Surgical History:   Procedure Laterality Date    [place] CARDIAC SURG PROCEDURE UNLIST       No Known Allergies   Family History   Problem Relation Age of Onset    Dementia Mother       Current Outpatient Medications   Medication Sig    ascorbic acid, vitamin C, (Vitamin C) 500 mg tablet Take 1,000 mg by mouth daily.  atorvastatin (LIPITOR) 20 mg tablet TAKE 1 TABLET BY MOUTH DAILY    ergocalciferol (ERGOCALCIFEROL) 1,250 mcg (50,000 unit) capsule Take 1 Capsule by mouth every seven (7) days.     lisinopriL (PRINIVIL, ZESTRIL) 20 mg tablet Take 1 Tablet by mouth daily.    folic acid (FOLVITE) 1 mg tablet Take 1 Tablet by mouth daily.  colchicine 0.6 mg tablet TAKE 1 TABLET BY MOUTH THREE TIMES DAILY ON FIRST DAY OF GOUT ATTACK. ON DAY 2 AND BEYOND TAKE 1 TABLET BY MOUTH DAILY UNTIL ATTACK RESOLVES.  aspirin delayed-release 81 mg tablet Take 81 mg by mouth daily.  methylPREDNISolone (MEDROL DOSEPACK) 4 mg tablet Take as directed on packaging (Patient not taking: Reported on 2022)     No current facility-administered medications for this visit. There were no vitals filed for this visit. Social History     Socioeconomic History    Marital status:      Spouse name: Not on file    Number of children: Not on file    Years of education: Not on file    Highest education level: Not on file   Occupational History    Not on file   Tobacco Use    Smoking status: Former Smoker     Years: 50.00     Quit date: 2015     Years since quittin.0    Smokeless tobacco: Former User     Quit date: 2015   Vaping Use    Vaping Use: Never used   Substance and Sexual Activity    Alcohol use: Not Currently    Drug use: Never    Sexual activity: Yes   Other Topics Concern    Not on file   Social History Narrative    Not on file     Social Determinants of Health     Financial Resource Strain:     Difficulty of Paying Living Expenses: Not on file   Food Insecurity:     Worried About [address] in the Last Year: Not on file    [address] in the Last Year: Not on file   Transportation Needs:     Lack of Transportation (Medical): Not on file    Lack of Transportation (Non-Medical):  Not on file   Physical Activity:     Days of Exercise per Week: Not on file    Minutes of Exercise per Session: Not on file   Stress:     Feeling of Stress : Not on file   Social Connections:     Frequency of Communication with Friends and Family: Not on file    Frequency of Social Gatherings with Friends and Family: Not on file    Attends Episcopalian Services: Not on file    Active Member of Clubs or Organizations: Not on file    Attends Club or Organization Meetings: Not on file    Marital Status: Not on file   Intimate Partner Violence:     Fear of Current or Ex-Partner: Not on file    Emotionally Abused: Not on file    Physically Abused: Not on file    Sexually Abused: Not on file   Housing Stability:     Unable to Pay for Housing in the Last Year: Not on file    Number of [place] in the Last Year: Not on file    Unstable Housing in the Last Year: Not on file       MEDICATIONS     Current Outpatient Medications   Medication Sig    ascorbic acid, vitamin C, (Vitamin C) 500 mg tablet Take 1,000 mg by mouth daily.  atorvastatin (LIPITOR) 20 mg tablet TAKE 1 TABLET BY MOUTH DAILY    ergocalciferol (ERGOCALCIFEROL) 1,250 mcg (50,000 unit) capsule Take 1 Capsule by mouth every seven (7) days.  lisinopriL (PRINIVIL, ZESTRIL) 20 mg tablet Take 1 Tablet by mouth daily.  folic acid (FOLVITE) 1 mg tablet Take 1 Tablet by mouth daily.  colchicine 0.6 mg tablet TAKE 1 TABLET BY MOUTH THREE TIMES DAILY ON FIRST DAY OF GOUT ATTACK. ON DAY 2 AND BEYOND TAKE 1 TABLET BY MOUTH DAILY UNTIL ATTACK RESOLVES.  aspirin delayed-release 81 mg tablet Take 81 mg by mouth daily.  methylPREDNISolone (MEDROL DOSEPACK) 4 mg tablet Take as directed on packaging (Patient not taking: Reported on 1/27/2022)     No current facility-administered medications for this visit. I have reviewed the nurses notes, vitals, problem list, allergy list, medical history, family, social history and medications. REVIEW OF SYMPTOMS     General: Pt denies excessive weight gain or loss. Pt is able to conduct ADL's  HEENT: Denies blurred vision, headaches, hearing loss, epistaxis and difficulty swallowing. Respiratory: Denies cough, congestion, shortness of breath, MADRIGAL, wheezing or stridor.   Cardiovascular: Denies precordial pain, palpitations, edema or PND  Gastrointestinal: Denies poor appetite, indigestion, abdominal pain or blood in stool  Genitourinary: Denies hematuria, dysuria, increased urinary frequency  Musculoskeletal: Denies joint pain or swelling from muscles or joints  Neurologic: Denies tremor, paresthesias, headache, or sensory motor disturbance  Psychiatric: Denies confusion, insomnia, depression  Integumentray: Denies rash, itching or ulcers. Hematologic: Denies easy bruising, bleeding       PHYSICAL EXAM       Due to this being a telephone encounter a very limited exam was performed  Neurological: A&Ox3, no slurred speech, answering questions appropriately  Respiratory: Non labored, talking in complete sentences, no audible wheeze over the phone        ASSESSMENT        Diagnoses and all orders for this visit:    1. Coronary artery disease involving native coronary artery of native heart without angina pectoris  -     REFERRAL TO CARDIOLOGY    2. Mixed hyperlipidemia  -     atorvastatin (LIPITOR) 20 mg tablet; TAKE 1 TABLET BY MOUTH DAILY    3. Vitamin D deficiency  -     ergocalciferol (ERGOCALCIFEROL) 1,250 mcg (50,000 unit) capsule; Take 1 Capsule by mouth every seven (7) days. 4. Essential hypertension  -     lisinopriL (PRINIVIL, ZESTRIL) 20 mg tablet; Take 1 Tablet by mouth daily. ICD-10-CM ICD-9-CM    1. Coronary artery disease involving native coronary artery of native heart without angina pectoris  I25.10 414.01 REFERRAL TO CARDIOLOGY   2. Mixed hyperlipidemia  E78.2 272.2 atorvastatin (LIPITOR) 20 mg tablet   3. Vitamin D deficiency  E55.9 268.9 ergocalciferol (ERGOCALCIFEROL) 1,250 mcg (50,000 unit) capsule   4.  Essential hypertension  I10 401.9 lisinopriL (PRINIVIL, ZESTRIL) 20 mg tablet     Orders Placed This Encounter    REFERRAL TO CARDIOLOGY     Referral Priority:   Routine     Referral Type:   Consultation     Referral Reason:   Specialty Services Required     Referred to Provider:   George Camejo MD     Number of Visits Requested:   1    ascorbic acid, vitamin C, (Vitamin C) 500 mg tablet     Sig: Take 1,000 mg by mouth daily.  atorvastatin (LIPITOR) 20 mg tablet     Sig: TAKE 1 TABLET BY MOUTH DAILY     Dispense:  90 Tablet     Refill:  1    ergocalciferol (ERGOCALCIFEROL) 1,250 mcg (50,000 unit) capsule     Sig: Take 1 Capsule by mouth every seven (7) days. Dispense:  12 Capsule     Refill:  1    lisinopriL (PRINIVIL, ZESTRIL) 20 mg tablet     Sig: Take 1 Tablet by mouth daily. Dispense:  90 Tablet     Refill:  1        PLAN       TIME 21(Minutes) SPENT RELATED TO THIS PHONE ENCOUNTER    We discussed the expected course, resolution and complications of the diagnosis(es) in detail. Medication risks, benefits, costs, interactions, and alternatives were discussed as indicated. I advised him to contact the office if his condition worsens, changes or fails to improve as anticipated.  He expressed understanding with the diagnosis(es) and plan

## 2022-02-02 NOTE — TELEPHONE ENCOUNTER
I spoke with the patient and he said he does not feel like going out to get a COVID test because he is pretty sure he does have it. He said it's all in his sinus area, it has not moved into his chest and he has no cough or shortness of breath. I advised him to utilize OTC cold medication, Afrin nasal spray to open his sinuses up, and flonase daily also. He said his body is sore too so I told him to make sure he takes tylenol or ibuprofen to help with the soreness. He said he is on day 5 of feeling like this. I advised him to let us know if he gets any worse or starts to feel like he is getting chest congestion and I will talk with [facility] about sending in some medication to clear up his chest. Patient verbalized understanding.